# Patient Record
Sex: MALE | Race: WHITE | Employment: FULL TIME | URBAN - METROPOLITAN AREA
[De-identification: names, ages, dates, MRNs, and addresses within clinical notes are randomized per-mention and may not be internally consistent; named-entity substitution may affect disease eponyms.]

---

## 2017-10-27 ENCOUNTER — APPOINTMENT (OUTPATIENT)
Dept: CT IMAGING | Age: 47
End: 2017-10-27
Payer: COMMERCIAL

## 2017-10-27 ENCOUNTER — HOSPITAL ENCOUNTER (EMERGENCY)
Age: 47
Discharge: HOME | End: 2017-10-27
Payer: COMMERCIAL

## 2017-10-27 VITALS
BODY MASS INDEX: 22.91 KG/M2 | DIASTOLIC BLOOD PRESSURE: 76 MMHG | TEMPERATURE: 97.2 F | HEIGHT: 70 IN | HEART RATE: 70 BPM | OXYGEN SATURATION: 96 % | RESPIRATION RATE: 13 BRPM | WEIGHT: 160.06 LBS | SYSTOLIC BLOOD PRESSURE: 115 MMHG

## 2017-10-27 DIAGNOSIS — I10 HYPERTENSION, UNSPECIFIED TYPE: Primary | ICD-10-CM

## 2017-10-27 DIAGNOSIS — N20.0 KIDNEY STONE: ICD-10-CM

## 2017-10-27 DIAGNOSIS — N23 RENAL COLIC: ICD-10-CM

## 2017-10-27 LAB
ABSOLUTE EOS #: 0.2 K/UL (ref 0–0.4)
ABSOLUTE IMMATURE GRANULOCYTE: ABNORMAL K/UL (ref 0–0.3)
ABSOLUTE LYMPH #: 1.8 K/UL (ref 1–4.8)
ABSOLUTE MONO #: 0.9 K/UL (ref 0.2–0.8)
ANION GAP SERPL CALCULATED.3IONS-SCNC: 15 MMOL/L (ref 9–17)
BASOPHILS # BLD: 1 %
BASOPHILS ABSOLUTE: 0.1 K/UL (ref 0–0.2)
BUN BLDV-MCNC: 16 MG/DL (ref 6–20)
BUN/CREAT BLD: 17 (ref 9–20)
CALCIUM SERPL-MCNC: 8.7 MG/DL (ref 8.6–10.4)
CHLORIDE BLD-SCNC: 103 MMOL/L (ref 98–107)
CK MB: 1.7 NG/ML
CO2: 21 MMOL/L (ref 20–31)
CREAT SERPL-MCNC: 0.93 MG/DL (ref 0.7–1.2)
DIFFERENTIAL TYPE: ABNORMAL
EKG ATRIAL RATE: 83 BPM
EKG P AXIS: 67 DEGREES
EKG P-R INTERVAL: 144 MS
EKG Q-T INTERVAL: 362 MS
EKG QRS DURATION: 90 MS
EKG QTC CALCULATION (BAZETT): 425 MS
EKG R AXIS: 22 DEGREES
EKG T AXIS: -3 DEGREES
EKG VENTRICULAR RATE: 83 BPM
EOSINOPHILS RELATIVE PERCENT: 2 %
GFR AFRICAN AMERICAN: >60 ML/MIN
GFR NON-AFRICAN AMERICAN: >60 ML/MIN
GFR SERPL CREATININE-BSD FRML MDRD: ABNORMAL ML/MIN/{1.73_M2}
GFR SERPL CREATININE-BSD FRML MDRD: ABNORMAL ML/MIN/{1.73_M2}
GLUCOSE BLD-MCNC: 111 MG/DL (ref 70–99)
HCT VFR BLD CALC: 50.3 % (ref 41–53)
HEMOGLOBIN: 17 G/DL (ref 13.5–17.5)
IMMATURE GRANULOCYTES: ABNORMAL %
LYMPHOCYTES # BLD: 22 %
MCH RBC QN AUTO: 30 PG (ref 26–34)
MCHC RBC AUTO-ENTMCNC: 33.9 G/DL (ref 31–37)
MCV RBC AUTO: 88.5 FL (ref 80–100)
MONOCYTES # BLD: 11 %
MYOGLOBIN: 26 NG/ML (ref 28–72)
PDW BLD-RTO: 13.3 % (ref 11.5–14.5)
PLATELET # BLD: 218 K/UL (ref 130–400)
PLATELET ESTIMATE: ABNORMAL
PMV BLD AUTO: 8.5 FL (ref 6–12)
POTASSIUM SERPL-SCNC: 4.1 MMOL/L (ref 3.7–5.3)
RBC # BLD: 5.68 M/UL (ref 4.5–5.9)
RBC # BLD: ABNORMAL 10*6/UL
SEG NEUTROPHILS: 64 %
SEGMENTED NEUTROPHILS ABSOLUTE COUNT: 5.1 K/UL (ref 1.8–7.7)
SODIUM BLD-SCNC: 139 MMOL/L (ref 135–144)
TROPONIN INTERP: NORMAL
TROPONIN T: <0.03 NG/ML
WBC # BLD: 8.1 K/UL (ref 3.5–11)
WBC # BLD: ABNORMAL 10*3/UL

## 2017-10-27 PROCEDURE — 85025 COMPLETE CBC W/AUTO DIFF WBC: CPT

## 2017-10-27 PROCEDURE — 99285 EMERGENCY DEPT VISIT HI MDM: CPT

## 2017-10-27 PROCEDURE — 71275 CT ANGIOGRAPHY CHEST: CPT

## 2017-10-27 PROCEDURE — 2580000003 HC RX 258

## 2017-10-27 PROCEDURE — 93005 ELECTROCARDIOGRAM TRACING: CPT

## 2017-10-27 PROCEDURE — 72191 CT ANGIOGRAPH PELV W/O&W/DYE: CPT

## 2017-10-27 PROCEDURE — 82553 CREATINE MB FRACTION: CPT

## 2017-10-27 PROCEDURE — 6370000000 HC RX 637 (ALT 250 FOR IP)

## 2017-10-27 PROCEDURE — 96375 TX/PRO/DX INJ NEW DRUG ADDON: CPT

## 2017-10-27 PROCEDURE — 80048 BASIC METABOLIC PNL TOTAL CA: CPT

## 2017-10-27 PROCEDURE — 84484 ASSAY OF TROPONIN QUANT: CPT

## 2017-10-27 PROCEDURE — 6360000004 HC RX CONTRAST MEDICATION

## 2017-10-27 PROCEDURE — 96361 HYDRATE IV INFUSION ADD-ON: CPT

## 2017-10-27 PROCEDURE — 96374 THER/PROPH/DIAG INJ IV PUSH: CPT

## 2017-10-27 PROCEDURE — 83874 ASSAY OF MYOGLOBIN: CPT

## 2017-10-27 PROCEDURE — 6360000002 HC RX W HCPCS

## 2017-10-27 RX ORDER — CLONIDINE HYDROCHLORIDE 0.1 MG/1
0.2 TABLET ORAL ONCE
Status: COMPLETED | OUTPATIENT
Start: 2017-10-27 | End: 2017-10-27

## 2017-10-27 RX ORDER — SIMVASTATIN 20 MG
20 TABLET ORAL NIGHTLY
COMMUNITY

## 2017-10-27 RX ORDER — OXYCODONE HYDROCHLORIDE AND ACETAMINOPHEN 5; 325 MG/1; MG/1
1-2 TABLET ORAL EVERY 6 HOURS PRN
Qty: 20 TABLET | Refills: 0 | Status: SHIPPED | OUTPATIENT
Start: 2017-10-27

## 2017-10-27 RX ORDER — HYDRALAZINE HYDROCHLORIDE 20 MG/ML
10 INJECTION INTRAMUSCULAR; INTRAVENOUS EVERY 6 HOURS PRN
Status: DISCONTINUED | OUTPATIENT
Start: 2017-10-27 | End: 2017-10-27 | Stop reason: HOSPADM

## 2017-10-27 RX ORDER — 0.9 % SODIUM CHLORIDE 0.9 %
50 INTRAVENOUS SOLUTION INTRAVENOUS ONCE
Status: COMPLETED | OUTPATIENT
Start: 2017-10-27 | End: 2017-10-27

## 2017-10-27 RX ORDER — CEPHALEXIN 500 MG/1
500 CAPSULE ORAL 3 TIMES DAILY
Qty: 21 CAPSULE | Refills: 0 | Status: SHIPPED | OUTPATIENT
Start: 2017-10-27

## 2017-10-27 RX ORDER — SODIUM CHLORIDE 0.9 % (FLUSH) 0.9 %
10 SYRINGE (ML) INJECTION PRN
Status: DISCONTINUED | OUTPATIENT
Start: 2017-10-27 | End: 2017-10-27 | Stop reason: HOSPADM

## 2017-10-27 RX ORDER — TAMSULOSIN HYDROCHLORIDE 0.4 MG/1
0.4 CAPSULE ORAL DAILY
Qty: 5 CAPSULE | Refills: 0 | Status: SHIPPED | OUTPATIENT
Start: 2017-10-27 | End: 2017-11-01

## 2017-10-27 RX ORDER — PROMETHAZINE HYDROCHLORIDE 25 MG/1
25 TABLET ORAL EVERY 6 HOURS PRN
Qty: 20 TABLET | Refills: 0 | Status: SHIPPED | OUTPATIENT
Start: 2017-10-27 | End: 2017-11-03

## 2017-10-27 RX ORDER — PROMETHAZINE HYDROCHLORIDE 25 MG/ML
12.5 INJECTION, SOLUTION INTRAMUSCULAR; INTRAVENOUS ONCE
Status: COMPLETED | OUTPATIENT
Start: 2017-10-27 | End: 2017-10-27

## 2017-10-27 RX ORDER — ASPIRIN 81 MG/1
81 TABLET, CHEWABLE ORAL DAILY
COMMUNITY

## 2017-10-27 RX ORDER — 0.9 % SODIUM CHLORIDE 0.9 %
1000 INTRAVENOUS SOLUTION INTRAVENOUS ONCE
Status: COMPLETED | OUTPATIENT
Start: 2017-10-27 | End: 2017-10-27

## 2017-10-27 RX ADMIN — Medication 10 ML: at 02:55

## 2017-10-27 RX ADMIN — SODIUM CHLORIDE 50 ML: 9 INJECTION, SOLUTION INTRAVENOUS at 02:55

## 2017-10-27 RX ADMIN — HYDRALAZINE HYDROCHLORIDE 10 MG: 20 INJECTION INTRAMUSCULAR; INTRAVENOUS at 02:01

## 2017-10-27 RX ADMIN — IOPAMIDOL 100 ML: 755 INJECTION, SOLUTION INTRAVENOUS at 02:55

## 2017-10-27 RX ADMIN — SODIUM CHLORIDE 1000 ML: 9 INJECTION, SOLUTION INTRAVENOUS at 01:07

## 2017-10-27 RX ADMIN — PROMETHAZINE HYDROCHLORIDE 12.5 MG: 25 INJECTION INTRAMUSCULAR; INTRAVENOUS at 01:07

## 2017-10-27 RX ADMIN — HYDROMORPHONE HYDROCHLORIDE 1 MG: 1 INJECTION, SOLUTION INTRAMUSCULAR; INTRAVENOUS; SUBCUTANEOUS at 01:07

## 2017-10-27 RX ADMIN — CLONIDINE HYDROCHLORIDE 0.2 MG: 0.1 TABLET ORAL at 01:48

## 2017-10-27 ASSESSMENT — PAIN DESCRIPTION - PROGRESSION: CLINICAL_PROGRESSION: NOT CHANGED

## 2017-10-27 ASSESSMENT — PAIN DESCRIPTION - PAIN TYPE
TYPE: ACUTE PAIN
TYPE: ACUTE PAIN

## 2017-10-27 ASSESSMENT — ENCOUNTER SYMPTOMS
PHOTOPHOBIA: 0
SINUS PRESSURE: 0
VOMITING: 1
CHEST TIGHTNESS: 1
COUGH: 1
ABDOMINAL DISTENTION: 1
ABDOMINAL PAIN: 1
NAUSEA: 1
EYE REDNESS: 0
SHORTNESS OF BREATH: 0
CONSTIPATION: 1

## 2017-10-27 ASSESSMENT — PAIN SCALES - GENERAL
PAINLEVEL_OUTOF10: 2
PAINLEVEL_OUTOF10: 8

## 2017-10-27 ASSESSMENT — PAIN DESCRIPTION - DESCRIPTORS: DESCRIPTORS: SHARP

## 2017-10-27 ASSESSMENT — PAIN DESCRIPTION - FREQUENCY: FREQUENCY: CONTINUOUS

## 2017-10-27 ASSESSMENT — PAIN DESCRIPTION - LOCATION: LOCATION: ABDOMEN

## 2017-10-27 ASSESSMENT — PAIN DESCRIPTION - ORIENTATION: ORIENTATION: RIGHT

## 2017-10-27 NOTE — ED NOTES
Return from CT back on monitor.  Patient resting on cot and pain is \"better\" stated patient     Michela Verma RN  10/27/17 1967

## 2017-10-27 NOTE — ED PROVIDER NOTES
[address] [place] [address] ED  eMERGENCY dEPARTMENT eNCOUnter      Pt Name: Song Still  MRN: [id number]  [place] 1970  Date of evaluation: 10/27/2017  Provider: Peri Miller MD    CHIEF COMPLAINT       Chief Complaint   Patient presents with    Abdominal Pain     rt sided onset hour ago         HISTORY OF PRESENT ILLNESS  (Location/Symptom, Timing/Onset, Context/Setting, Quality, Duration, Modifying Factors, Severity.)   Song Still is a 50 y.o. male who presents to the emergency department Presents to the emergency department with pain at 10 on a scale of 1-10. States the pains in his flank area radiating into the groin. He also complains of chest pain with shortness of breath. Some nausea and vomiting. Patient does have a history of kidney stones in the past.  Also has a history of hypertensive disease. The pain while here seemed to get much worse as his blood pressure went up. Nursing Notes were reviewed. ALLERGIES     Ambien [zolpidem tartrate]; Morphine; and Vicodin [hydrocodone-acetaminophen]    CURRENT MEDICATIONS       Previous Medications    ACETAMINOPHEN-CODEINE (TYLENOL/CODEINE #3) 300-30 MG TABS    Take 1 tablet by mouth every 6 hours as needed    ALBUTEROL (PROVENTIL HFA) 108 (90 BASE) MCG/ACT AERS    Inhale 2 puffs into the lungs every 6 hours as needed (dyspnea, wheezing)    ALPRAZOLAM (XANAX) 0.5 MG TABLET    Take 0.5 mg by mouth nightly as needed for Sleep. ASPIRIN 81 MG CHEWABLE TABLET    Take 81 mg by mouth daily    AZITHROMYCIN (ZITHROMAX) 250 MG TABS    Take two tablets on day 1, followed by one tablet on days 2-5. CITALOPRAM (CELEXA) 10 MG TABLET    Take 10 mg by mouth daily. CYCLOBENZAPRINE (FLEXERIL) 10 MG TABLET    Take 1 tablet by mouth 3 times daily as needed for Muscle spasms. IBUPROFEN (ADVIL;MOTRIN) 800 MG TABLET    Take 1 tablet by mouth every 8 hours as needed for Pain. LISINOPRIL (PRINIVIL;ZESTRIL) 20 MG TABLET    Take 20 mg by mouth daily. METOPROLOL (TOPROL-XL) 25 MG XL TABLET    Take 25 mg by mouth daily. PROMETHAZINE-DEXTROMETHORPHAN (PROMETHAZINE-DM) 6.25-15 MG/5ML SYRUP    Take 5 mLs by mouth 4 times daily as needed for Cough    SIMVASTATIN (ZOCOR) 20 MG TABLET    Take 20 mg by mouth nightly       PAST MEDICAL HISTORY         Diagnosis Date    CAD (coronary artery disease)     Depression     Hypertension     Kidney stone        SURGICAL HISTORY           Procedure Laterality Date    APPENDECTOMY      BACK SURGERY      L5S1 fusion    CHOLECYSTECTOMY      CORONARY ANGIOPLASTY WITH STENT PLACEMENT  2011    CRANIOTOMY      x 2 for benign cyst    CSF SHUNT      HERNIA REPAIR      SHOULDER ARTHROPLASTY Right          FAMILY HISTORY     History reviewed. No pertinent family history. No family status information on file. SOCIAL HISTORY      reports that he has never smoked. He has never used smokeless tobacco. He reports that he drinks alcohol. He reports that he does not use drugs. REVIEW OF SYSTEMS    (2-9 systems for level 4, 10 or more for level 5)     Review of Systems   Constitutional: Positive for activity change, appetite change and fatigue. HENT: Positive for congestion. Negative for sinus pressure. Eyes: Negative for photophobia, redness and visual disturbance. Respiratory: Positive for cough and chest tightness. Negative for shortness of breath. Cardiovascular: Positive for chest pain and leg swelling. Negative for palpitations. Gastrointestinal: Positive for abdominal distention, abdominal pain, constipation, nausea and vomiting. Endocrine: Negative for polydipsia and polyphagia. Genitourinary: Positive for dysuria, flank pain, penile pain and urgency. Musculoskeletal: Positive for myalgias. Skin: Negative. Neurological: Negative for dizziness, facial asymmetry and weakness. Psychiatric/Behavioral: Negative for agitation and behavioral problems.         Except as noted above the remainder of the review of systems was reviewed and negative. PHYSICAL EXAM    (up to 7 for level 4, 8 or more for level 5)     ED Triage Vitals [10/27/17 0029]   BP Temp Temp Source Pulse Resp SpO2 Height Weight   (!) 192/133 97.2 °F (36.2 °C) Oral 86 18 96 % 5' 10\" (1.778 m) 160 lb 1 oz (72.6 kg)       Physical Exam   Constitutional: He is oriented to person, place, and time. He appears well-developed and well-nourished. HENT:   Head: Normocephalic and atraumatic. Right Ear: External ear normal.   Left Ear: External ear normal.   Mouth/Throat: No oropharyngeal exudate. Eyes: Conjunctivae are normal.   Neck: Normal range of motion. Neck supple. No tracheal deviation present. No thyromegaly present. Cardiovascular: Normal rate and regular rhythm. Exam reveals no gallop and no friction rub. No murmur heard. Pulmonary/Chest: Effort normal and breath sounds normal. No respiratory distress. Abdominal: Soft. Bowel sounds are normal. He exhibits no distension. There is no tenderness. Musculoskeletal: Normal range of motion. He exhibits no edema, tenderness or deformity. Neurological: He is alert and oriented to person, place, and time. Skin: Skin is warm and dry. Psychiatric: He has a normal mood and affect. Nursing note and vitals reviewed. DIAGNOSTIC RESULTS     EKG: All EKG's are interpreted by the Emergency Department Physician who either signs or Co-signs this chart in the absence of a cardiologist.    Normal sinus rhythm with occasional PVCs unifocal no evidence of acute process. Nonspecific ST wave abnormality.     RADIOLOGY:   Non-plain film images such as CT, Ultrasound and MRI are read by the radiologist. Plain radiographic images are visualized and preliminarily interpreted by the emergency physician with the below findings:    Because the patient developed substernal crushing chest pain associated with a elevated blood pressure a CAT scan of the chest abdomen and pelvis to rule out dissection was ordered  Cta Chest W Contrast    Result Date: 10/27/2017  1. No evidence of dissection of the thoracic or abdominal aorta. 2. Mild fusiform ectasia of the ascending thoracic aorta 4.0 cm. Otherwise no aneurysm of the thoracic or abdominal aorta. 3. Right axillary surgical clips in place with an abnormal appearing nonspecific 2.4 cm right axillary lymph node. Correlate with surgical history. 4. 4 mm obstructing calculus in the mid right ureter with upstream mild/moderate hydroureter and hydronephrosis. 5. 10 mm atlas within the left renal pelvis with what appears to be moderate uroepithelial thickening of the renal pelvis. 6. Additional 4 mm nonobstructing left renal calculus. 7. Prostatomegaly. 8. Colonic diverticulosis without evidence of diverticulitis. Cta Abdomen Pelvis W Contrast    Result Date: 10/27/2017  1. No evidence of dissection of the thoracic or abdominal aorta. 2. Mild fusiform ectasia of the ascending thoracic aorta 4.0 cm. Otherwise no aneurysm of the thoracic or abdominal aorta. 3. Right axillary surgical clips in place with an abnormal appearing nonspecific 2.4 cm right axillary lymph node. Correlate with surgical history. 4. 4 mm obstructing calculus in the mid right ureter with upstream mild/moderate hydroureter and hydronephrosis. 5. 10 mm atlas within the left renal pelvis with what appears to be moderate uroepithelial thickening of the renal pelvis. 6. Additional 4 mm nonobstructing left renal calculus. 7. Prostatomegaly. 8. Colonic diverticulosis without evidence of diverticulitis. Interpretation per the Radiologist below, if available at the time of this note:    CTA CHEST W CONTRAST   Preliminary Result   1. No evidence of dissection of the thoracic or abdominal aorta. 2. Mild fusiform ectasia of the ascending thoracic aorta 4.0 cm. Otherwise   no aneurysm of the thoracic or abdominal aorta.    3. Right axillary surgical clips in place with an abnormal appearing   nonspecific 2.4 cm right axillary lymph node. Correlate with surgical   history. 4. 4 mm obstructing calculus in the mid right ureter with upstream   mild/moderate hydroureter and hydronephrosis. 5. 10 mm atlas within the left renal pelvis with what appears to be moderate   uroepithelial thickening of the renal pelvis. 6. Additional 4 mm nonobstructing left renal calculus. 7. Prostatomegaly. 8. Colonic diverticulosis without evidence of diverticulitis. CTA ABDOMEN PELVIS W CONTRAST   Preliminary Result   1. No evidence of dissection of the thoracic or abdominal aorta. 2. Mild fusiform ectasia of the ascending thoracic aorta 4.0 cm. Otherwise   no aneurysm of the thoracic or abdominal aorta. 3. Right axillary surgical clips in place with an abnormal appearing   nonspecific 2.4 cm right axillary lymph node. Correlate with surgical   history. 4. 4 mm obstructing calculus in the mid right ureter with upstream   mild/moderate hydroureter and hydronephrosis. 5. 10 mm atlas within the left renal pelvis with what appears to be moderate   uroepithelial thickening of the renal pelvis. 6. Additional 4 mm nonobstructing left renal calculus. 7. Prostatomegaly. 8. Colonic diverticulosis without evidence of diverticulitis. ED BEDSIDE ULTRASOUND:   Performed by ED Physician - none    LABS:  Labs Reviewed   CBC WITH AUTO DIFFERENTIAL - Abnormal; Notable for the following:        Result Value    Absolute Mono # 0.90 (*)     All other components within normal limits   BASIC METABOLIC PANEL - Abnormal; Notable for the following:     Glucose 111 (*)     All other components within normal limits   URINE CULTURE   URINALYSIS       All other labs were within normal range or not returned as of this dictation.     EMERGENCY DEPARTMENT COURSE and DIFFERENTIAL DIAGNOSIS/MDM:   Vitals:    Vitals:    10/27/17 0159 10/27/17 0213 10/27/17 0314 10/27/17 0333   BP: (!) 156/112 (!) 153/95 115/84 122/83   Pulse: 74 82 85 76   Resp: 12 13 12 13   Temp:       TempSrc:       SpO2: 94% 95% 95% 95%   Weight:       Height:         Patient was medicated with hydralazine Dilaudid and Phenergan. We were going to start him on a Cardene drip but his pressure did respond nicely to the hydralazine and his blood pressure is now 122/83. His pain is gone except for some very mild colicky right flank pain. He is resting comfortably. His troponin myoglobin remained stable and his pain is controlled he will be discharged and will follow-up as a outpatient for his kidney stone. The hydronephrosis on the right is caused by 4 mm stone. Critical care time on this patient's 35 minutes    CONSULTS:  None    PROCEDURES:  Not clinically indicated. FINAL IMPRESSION      1. Hypertension, unspecified type    2. Renal colic    3. Kidney stone          DISPOSITION/PLAN   DISPOSITION Decision to Discharge    PATIENT REFERRED TO:   No follow-up provider specified. DISCHARGE MEDICATIONS:     New Prescriptions    CEPHALEXIN (KEFLEX) 500 MG CAPSULE    Take 1 capsule by mouth 3 times daily    OXYCODONE-ACETAMINOPHEN (PERCOCET) 5-325 MG PER TABLET    Take 1-2 tablets by mouth every 6 hours as needed for Pain . PROMETHAZINE (PHENERGAN) 25 MG TABLET    Take 1 tablet by mouth every 6 hours as needed for Nausea WARNING:  May cause drowsiness. May impair ability to operate vehicles or machinery. Do not use in combination with alcohol.     TAMSULOSIN (FLOMAX) 0.4 MG CAPSULE    Take 1 capsule by mouth daily for 5 doses           (Please note that portions of this note were completed with a voice recognition program.  Efforts were made to edit the dictations but occasionally words are mis-transcribed.)    Nick Mcpherson MD  Attending Emergency Physician           Nick Mcpherson MD  10/27/17 [id number]

## 2017-11-20 ENCOUNTER — APPOINTMENT (OUTPATIENT)
Dept: ULTRASOUND IMAGING | Age: 47
End: 2017-11-20
Payer: COMMERCIAL

## 2017-11-20 ENCOUNTER — HOSPITAL ENCOUNTER (EMERGENCY)
Age: 47
Discharge: HOME | End: 2017-11-20
Attending: EMERGENCY MEDICINE
Payer: COMMERCIAL

## 2017-11-20 VITALS
SYSTOLIC BLOOD PRESSURE: 154 MMHG | OXYGEN SATURATION: 98 % | DIASTOLIC BLOOD PRESSURE: 110 MMHG | WEIGHT: 161 LBS | BODY MASS INDEX: 23.05 KG/M2 | RESPIRATION RATE: 16 BRPM | HEIGHT: 70 IN | TEMPERATURE: 98 F | HEART RATE: 79 BPM

## 2017-11-20 DIAGNOSIS — R10.9 FLANK PAIN: Primary | ICD-10-CM

## 2017-11-20 LAB
ABSOLUTE EOS #: 0.1 K/UL (ref 0–0.4)
ABSOLUTE IMMATURE GRANULOCYTE: ABNORMAL K/UL (ref 0–0.3)
ABSOLUTE LYMPH #: 0.8 K/UL (ref 1–4.8)
ABSOLUTE MONO #: 0.8 K/UL (ref 0.2–0.8)
ANION GAP SERPL CALCULATED.3IONS-SCNC: 11 MMOL/L (ref 9–17)
BASOPHILS # BLD: 1 %
BASOPHILS ABSOLUTE: 0 K/UL (ref 0–0.2)
BUN BLDV-MCNC: 12 MG/DL (ref 6–20)
BUN/CREAT BLD: 15 (ref 9–20)
CALCIUM SERPL-MCNC: 9.5 MG/DL (ref 8.6–10.4)
CHLORIDE BLD-SCNC: 103 MMOL/L (ref 98–107)
CO2: 26 MMOL/L (ref 20–31)
CREAT SERPL-MCNC: 0.79 MG/DL (ref 0.7–1.2)
DIFFERENTIAL TYPE: ABNORMAL
EOSINOPHILS RELATIVE PERCENT: 2 %
GFR AFRICAN AMERICAN: >60 ML/MIN
GFR NON-AFRICAN AMERICAN: >60 ML/MIN
GFR SERPL CREATININE-BSD FRML MDRD: ABNORMAL ML/MIN/{1.73_M2}
GFR SERPL CREATININE-BSD FRML MDRD: ABNORMAL ML/MIN/{1.73_M2}
GLUCOSE BLD-MCNC: 106 MG/DL (ref 70–99)
HCT VFR BLD CALC: 47.9 % (ref 41–53)
HEMOGLOBIN: 16.2 G/DL (ref 13.5–17.5)
IMMATURE GRANULOCYTES: ABNORMAL %
LYMPHOCYTES # BLD: 13 %
MCH RBC QN AUTO: 29.9 PG (ref 26–34)
MCHC RBC AUTO-ENTMCNC: 33.8 G/DL (ref 31–37)
MCV RBC AUTO: 88.3 FL (ref 80–100)
MONOCYTES # BLD: 13 %
PDW BLD-RTO: 13 % (ref 11.5–14.5)
PLATELET # BLD: 312 K/UL (ref 130–400)
PLATELET ESTIMATE: ABNORMAL
PMV BLD AUTO: 7.3 FL (ref 6–12)
POTASSIUM SERPL-SCNC: 4.4 MMOL/L (ref 3.7–5.3)
RBC # BLD: 5.43 M/UL (ref 4.5–5.9)
RBC # BLD: ABNORMAL 10*6/UL
SEG NEUTROPHILS: 71 %
SEGMENTED NEUTROPHILS ABSOLUTE COUNT: 4.2 K/UL (ref 1.8–7.7)
SODIUM BLD-SCNC: 140 MMOL/L (ref 135–144)
WBC # BLD: 5.9 K/UL (ref 3.5–11)
WBC # BLD: ABNORMAL 10*3/UL

## 2017-11-20 PROCEDURE — 99284 EMERGENCY DEPT VISIT MOD MDM: CPT

## 2017-11-20 PROCEDURE — 96374 THER/PROPH/DIAG INJ IV PUSH: CPT

## 2017-11-20 PROCEDURE — 6360000002 HC RX W HCPCS: Performed by: NURSE PRACTITIONER

## 2017-11-20 PROCEDURE — 76775 US EXAM ABDO BACK WALL LIM: CPT

## 2017-11-20 PROCEDURE — 85025 COMPLETE CBC W/AUTO DIFF WBC: CPT

## 2017-11-20 PROCEDURE — 96375 TX/PRO/DX INJ NEW DRUG ADDON: CPT

## 2017-11-20 PROCEDURE — 96376 TX/PRO/DX INJ SAME DRUG ADON: CPT

## 2017-11-20 PROCEDURE — 80048 BASIC METABOLIC PNL TOTAL CA: CPT

## 2017-11-20 RX ORDER — ONDANSETRON 2 MG/ML
4 INJECTION INTRAMUSCULAR; INTRAVENOUS ONCE
Status: COMPLETED | OUTPATIENT
Start: 2017-11-20 | End: 2017-11-20

## 2017-11-20 RX ORDER — HYDROMORPHONE HCL 110MG/55ML
1 PATIENT CONTROLLED ANALGESIA SYRINGE INTRAVENOUS ONCE
Status: COMPLETED | OUTPATIENT
Start: 2017-11-20 | End: 2017-11-20

## 2017-11-20 RX ADMIN — HYDROMORPHONE HYDROCHLORIDE 1 MG: 2 INJECTION, SOLUTION INTRAMUSCULAR; INTRAVENOUS; SUBCUTANEOUS at 12:31

## 2017-11-20 RX ADMIN — ONDANSETRON 4 MG: 2 INJECTION INTRAMUSCULAR; INTRAVENOUS at 10:52

## 2017-11-20 RX ADMIN — HYDROMORPHONE HYDROCHLORIDE 1 MG: 1 INJECTION, SOLUTION INTRAMUSCULAR; INTRAVENOUS; SUBCUTANEOUS at 10:53

## 2017-11-20 ASSESSMENT — PAIN SCALES - GENERAL
PAINLEVEL_OUTOF10: 6
PAINLEVEL_OUTOF10: 3
PAINLEVEL_OUTOF10: 6
PAINLEVEL_OUTOF10: 5

## 2017-11-20 ASSESSMENT — ENCOUNTER SYMPTOMS
DIARRHEA: 0
NAUSEA: 0
ABDOMINAL PAIN: 0
COLOR CHANGE: 0
SORE THROAT: 0
SINUS PRESSURE: 0
CONSTIPATION: 0
COUGH: 0
WHEEZING: 0
RHINORRHEA: 0
VOMITING: 0
SHORTNESS OF BREATH: 0

## 2017-11-20 ASSESSMENT — PAIN DESCRIPTION - LOCATION: LOCATION: FLANK

## 2017-11-20 ASSESSMENT — PAIN DESCRIPTION - FREQUENCY: FREQUENCY: CONTINUOUS

## 2017-11-20 ASSESSMENT — PAIN DESCRIPTION - ORIENTATION: ORIENTATION: RIGHT

## 2017-11-20 ASSESSMENT — PAIN SCALES - WONG BAKER: WONGBAKER_NUMERICALRESPONSE: 6

## 2017-11-20 NOTE — ED PROVIDER NOTES
The patient was seen and examined by me in conjunction with the mid-level provider. I agree with his/her assessment and treatment plan. The patient is feeling improved and renal ultrasound does not show hydronephrosis.   He will follow-up promptly with his urologist.     Swapna lCark MD  11/20/17 [id number]

## 2017-11-20 NOTE — ED NOTES
Calls out c/o increase in pain. Up to 7/10. Mery Polanco CNP informed.      Kasey Goetz RN  11/20/17 7030

## 2017-11-20 NOTE — ED PROVIDER NOTES
[address] [place] [address] ED  eMERGENCY dEPARTMENT eNCOUnter      Pt Name: Michelle Hood  MRN: [id number]  [place] 1970  Date of evaluation: 11/20/2017  Provider: Jailyn Edwards NP, [facility] 2095       Chief Complaint   Patient presents with    Flank Pain     right onset 8am history of kidney stones         HISTORY OF PRESENT ILLNESS  (Location/Symptom, Timing/Onset, Context/Setting, Quality, Duration, Modifying Factors, Severity.)   Michelle Hood is a 50 y.o. male who presents to the emergency department Today by private vehicle for evaluation of flank pain. Patient states that about 8:00 this morning he developed a sudden onset of pain to his right flank. He states he can slightly feel the pain in the right abdomen. He rates the pain a 7 on a 0-10 scale. He states he took 2 oxycodone which did improve his pain. He does have a history of kidney stones with his last CAT scan  the 27th which showed a 4 mm mid right ureteral stone. He does not recall passing stone. He denies any nausea or vomiting. She fevers or chills. Nursing Notes were reviewed. ALLERGIES     Ambien [zolpidem tartrate]; Morphine; and Vicodin [hydrocodone-acetaminophen]    CURRENT MEDICATIONS       Discharge Medication List as of 11/20/2017  1:35 PM      CONTINUE these medications which have NOT CHANGED    Details   aspirin 81 MG chewable tablet Take 81 mg by mouth dailyHistorical Med      simvastatin (ZOCOR) 20 MG tablet Take 20 mg by mouth nightlyHistorical Med      oxyCODONE-acetaminophen (PERCOCET) 5-325 MG per tablet Take 1-2 tablets by mouth every 6 hours as needed for Pain ., Disp-20 tablet, R-0Print      citalopram (CELEXA) 10 MG tablet Take 10 mg by mouth daily. lisinopril (PRINIVIL;ZESTRIL) 20 MG tablet Take 20 mg by mouth daily. metoprolol (TOPROL-XL) 25 MG XL tablet Take 25 mg by mouth daily.       ALPRAZolam (XANAX) 0.5 MG tablet Take 0.5 mg by mouth nightly as needed for Sleep.      tamsulosin (FLOMAX) 0.4 MG capsule Take 1 capsule by mouth daily for 5 doses, Disp-5 capsule, R-0Print      cephALEXin (KEFLEX) 500 MG capsule Take 1 capsule by mouth 3 times daily, Disp-21 capsule, R-0Print      albuterol (PROVENTIL HFA) 108 (90 BASE) MCG/ACT AERS Inhale 2 puffs into the lungs every 6 hours as needed (dyspnea, wheezing), Disp-1 Inhaler, R-0      promethazine-dextromethorphan (PROMETHAZINE-DM) 6.25-15 MG/5ML syrup Take 5 mLs by mouth 4 times daily as needed for Cough, Disp-140 mL, R-0      acetaminophen-codeine (TYLENOL/CODEINE #3) 300-30 MG TABS Take 1 tablet by mouth every 6 hours as needed, Disp-20 tablet, R-0      azithromycin (ZITHROMAX) 250 MG TABS Take two tablets on day 1, followed by one tablet on days 2-5., Disp-1 packet, R-0      cyclobenzaprine (FLEXERIL) 10 MG tablet Take 1 tablet by mouth 3 times daily as needed for Muscle spasms. , Disp-20 tablet, R-0      ibuprofen (ADVIL;MOTRIN) 800 MG tablet Take 1 tablet by mouth every 8 hours as needed for Pain., Disp-20 tablet, R-0             PAST MEDICAL HISTORY         Diagnosis Date    CAD (coronary artery disease)     Depression     Hyperlipidemia     Hypertension     Kidney stone        SURGICAL HISTORY           Procedure Laterality Date    APPENDECTOMY      BACK SURGERY      L5S1 fusion    CHOLECYSTECTOMY      CORONARY ANGIOPLASTY WITH STENT PLACEMENT  2011    CRANIOTOMY      x 2 for benign cyst    CSF SHUNT      HERNIA REPAIR      SHOULDER ARTHROPLASTY Right          FAMILY HISTORY     History reviewed. No pertinent family history. No family status information on file. SOCIAL HISTORY      reports that he has never smoked. He has never used smokeless tobacco. He reports that he drinks alcohol. He reports that he does not use drugs. REVIEW OF SYSTEMS    (2-9 systems for level 4, 10 or more for level 5)     Review of Systems   Constitutional: Negative for chills, fever and unexpected weight change.    HENT: Negative for congestion, rhinorrhea, sinus pressure and sore throat. Respiratory: Negative for cough, shortness of breath and wheezing. Cardiovascular: Negative for chest pain and palpitations. Gastrointestinal: Negative for abdominal pain, constipation, diarrhea, nausea and vomiting. Genitourinary: Positive for flank pain. Negative for dysuria and hematuria. Musculoskeletal: Negative for arthralgias and myalgias. Skin: Negative for color change and rash. Neurological: Negative for dizziness, weakness and headaches. Hematological: Negative for adenopathy. Except as noted above the remainder of the review of systems was reviewed and negative. PHYSICAL EXAM    (up to 7 for level 4, 8 or more for level 5)     ED Triage Vitals [11/20/17 1031]   BP Temp Temp Source Pulse Resp SpO2 Height Weight   (!) 154/110 98 °F (36.7 °C) Oral 79 16 98 % 5' 10\" (1.778 m) 161 lb (73 kg)       Physical Exam   Constitutional: He is oriented to person, place, and time. He appears well-developed and well-nourished. HENT:   Head: Normocephalic and atraumatic. Mouth/Throat: Oropharynx is clear and moist.   Eyes: Conjunctivae are normal. Pupils are equal, round, and reactive to light. Neck: Normal range of motion. Neck supple. Cardiovascular: Normal rate and regular rhythm. Pulmonary/Chest: Effort normal and breath sounds normal. No stridor. No respiratory distress. Abdominal: Soft. Bowel sounds are normal.   Musculoskeletal: Normal range of motion. Lymphadenopathy:     He has no cervical adenopathy. Neurological: He is alert and oriented to person, place, and time. Skin: Skin is warm and dry. No rash noted. Psychiatric: He has a normal mood and affect. Vitals reviewed.         RADIOLOGY:   Non-plain film images such as CT, Ultrasound and MRI are read by the radiologist. Plain radiographic images are visualized and preliminarily interpreted by the emergency physician with the below findings:    \Cta Abdomen Pelvis W Contrast    Result Date: 10/28/2017  EXAMINATION: CTA OF THE ABDOMEN AND PELVIS WITH AND WITHOUT CONTRAST; CTA OF THE CHEST 10/27/2017 3:14 am TECHNIQUE: CTA of the abdomen and pelvis was performed without and with the administration of intravenous contrast. Multiplanar reformatted images are provided for review. MIP images are provided for review. Dose modulation, iterative reconstruction, and/or weight based adjustment of the mA/kV was utilized to reduce the radiation dose to as low as reasonably achievable.; CTA of the chest was performed after the administration of intravenous contrast.  Multiplanar reformatted images are provided for review. MIP images are provided for review. Dose modulation, iterative reconstruction, and/or weight based adjustment of the mA/kV was utilized to reduce the radiation dose to as low as reasonably achievable. COMPARISON: None October 22, 2014 HISTORY: ORDERING SYSTEM PROVIDED HISTORY: Rule out dissection TECHNOLOGIST PROVIDED HISTORY: IV Only Contrast; ORDERING SYSTEM PROVIDED HISTORY: Rule out dissection FINDINGS: Chest: CTA:  There is fusiform ectasia of ascending thoracic aorta to 4.0 cm. The aortic arch great vessels are unremarkable. There is mild atherosclerotic disease of the descending thoracic aorta. There is otherwise no evidence of aneurysm or dissection of the thoracic aorta. Mediastinum: Visualized portion of the thyroid gland unremarkable. There is no mediastinal hemorrhage. There is no pneumomediastinum. There is no acute cardiac abnormality. There is no significant pericardial effusion or thickening. There are mild coronary artery calcifications. The central airways are widely patent. There is no significant lymphadenopathy. The main pulmonary artery caliber is not significantly dilated. The central pulmonary arteries demonstrate no filling defects to suggest pulmonary emboli. Lungs/pleura: Mild biapical scarring. No pneumothorax.   No pleural effusions. Mild dependent atelectatic changes are noted. Otherwise no consolidation. Soft Tissues/Bones: There are surgical clips in the right axilla. There is a 2.4 cm right axillary lymph node. There are mild degenerative changes of bilateral shoulders. No acute osseous abnormality of thoracic spine. Abdomen/Pelvis: CTA:  There is no acute abnormality of the abdominal aorta. There is minimal atherosclerotic disease at the distal aspect of the abdominal aorta, without evidence of aneurysmal dilatation or dissection. The celiac axis, SMA and GLORY vessels are widely patent. No acute abnormality of the major pelvic arteries. Organs: There is no acute hepatic abnormality. No intrahepatic bile duct dilatation. Status post cholecystectomy. Bilateral adrenal glands unremarkable. The spleen demonstrates no acute abnormality. The pancreas demonstrates no acute abnormality. There is a 10 mm stone within the left renal pelvis with what appears to be mild nonspecific uroepithelial thickening of the renal pelvis. Additional 4  mm nonobstructing left renal calculus is noted. There is a 4 mm obstructing calculus in the mid right ureter with mild/moderate upstream hydroureter and hydronephrosis. Bilateral kidneys otherwise enhance uniformly. No suspicious renal lesions identified. There is a 5 mm hypodensity in the right kidney which is too small to characterize, but likely represents a cyst. GI/Bowel: There is a small sliding-type hiatal hernia. Otherwise no acute gastric abnormality. No acute small bowel abnormality. No evidence of small bowel obstruction. There is scattered colonic diverticula without any evidence of diverticulitis. Otherwise no acute colonic abnormality. Appendix is not clearly seen. Pelvis: There is prostatomegaly.   Tiny amount of hyperdensity within the posterior bladder could represent layering stones, bladder wall calcification or contrast. Peritoneum/Retroperitoneum: There is no significant lymphadenopathy. No free intraperitoneal air. The IVC is grossly unremarkable. There is no free fluid. Bones/Soft Tissues: No acute osseous abnormality. Postsurgical changes from posterior L5-S1 fusion with transpedicular rods and screws in place, unchanged in appearance compared to the prior study.  shunt catheter terminates in the left upper abdomen. 1. No evidence of dissection of the thoracic or abdominal aorta. 2. Mild fusiform ectasia of the ascending thoracic aorta to 4.0 cm. Otherwise no aneurysm of the thoracic or abdominal aorta. 3. Right axillary surgical clips in place with an abnormal appearing nonspecific 2.4 cm right axillary lymph node. Correlate with surgical history. 4. There is a 4 mm obstructing calculus in the mid right ureter with upstream mild/moderate hydroureter and hydronephrosis. 5. There is a 10 mm calculus within the left renal pelvis with what appears to be moderate uroepithelial thickening of the renal pelvis. 6. Additional 4 mm nonobstructing left renal calculus. 7. Prostatomegaly. 8. Colonic diverticulosis without evidence of diverticulitis. Us Retroperitoneal Limited    Result Date: 11/20/2017  EXAMINATION: RETROPERITONEAL ULTRASOUND OF THE KIDNEYS AND URINARY BLADDER 11/20/2017 COMPARISON: None HISTORY: ORDERING SYSTEM PROVIDED HISTORY: history of stone, hydronephrosis? TECHNOLOGIST PROVIDED HISTORY: Ordering Physician Provided Reason for Exam: right abd pain x 1 month, h/o kidney stones FINDINGS: Kidneys: The right kidney measures 11.1 x 6.36 x 4.81 cm. The left kidney measures 11.92 x 4.76 x 4.56 cm. Kidneys demonstrate normal cortical echogenicity. No evidence of hydronephrosis or intrarenal stones. No cortical thinning is noted. Unremarkable ultrasound of the kidneys. Interpretation per the Radiologist below, if available at the time of this note:    2011 [facility]   Final Result   Unremarkable ultrasound of the kidneys. LABS:  Labs Reviewed   CBC WITH AUTO DIFFERENTIAL - Abnormal; Notable for the following:        Result Value    Absolute Lymph # 0.80 (*)     All other components within normal limits   BASIC METABOLIC PANEL - Abnormal; Notable for the following:     Glucose 106 (*)     All other components within normal limits   UA W/REFLEX CULTURE       All other labs were within normal range or not returned as of this dictation. EMERGENCY DEPARTMENT COURSE and DIFFERENTIAL DIAGNOSIS/MDM:   Vitals:    Vitals:    11/20/17 1031   BP: (!) 154/110   Pulse: 79   Resp: 16   Temp: 98 °F (36.7 °C)   TempSrc: Oral   SpO2: 98%   Weight: 161 lb (73 kg)   Height: 5' 10\" (1.778 m)       Medical Decision Making: Patient just had a CT scan on 27 October. We did do a renal ultrasound which showed no hydronephrosis or hydroureter. His urinalysis and labs are unremarkable. He is stable to be discharged home. Continue oxycodone for pain and follow-up with urology  Medications   HYDROmorphone (DILAUDID) injection 1 mg (1 mg Intravenous Given 11/20/17 1053)   ondansetron (ZOFRAN) injection 4 mg (4 mg Intravenous Given 11/20/17 1052)   HYDROmorphone (DILAUDID) injection 1 mg (1 mg Intravenous Given 11/20/17 1231)       FINAL IMPRESSION      1.  Flank pain          DISPOSITION/PLAN   DISPOSITION Decision to Discharge    PATIENT REFERRED TO:   Cece Leahy MD  [address] [place] [address]  [phone]    Call in 2 days      [address]  [phone]    Call in 2 days        DISCHARGE MEDICATIONS:     Discharge Medication List as of 11/20/2017  1:35 PM              (Please note that portions of this note were completed with a voice recognition program.  Efforts were made to edit the dictations but occasionally words are mis-transcribed.)    Kenya Roque NP, CNP  Certified Nurse Practitioner            Mariel Croft  11/20/17 [id number]

## 2018-12-31 ENCOUNTER — HOSPITAL ENCOUNTER (OUTPATIENT)
Age: 48
Setting detail: SPECIMEN
Discharge: HOME | End: 2018-12-31
Payer: COMMERCIAL

## 2019-01-04 LAB — SURGICAL PATHOLOGY REPORT: NORMAL

## 2019-12-11 ENCOUNTER — APPOINTMENT (OUTPATIENT)
Dept: GENERAL RADIOLOGY | Age: 49
End: 2019-12-11
Payer: COMMERCIAL

## 2019-12-11 ENCOUNTER — HOSPITAL ENCOUNTER (EMERGENCY)
Age: 49
Discharge: HOME | End: 2019-12-11
Attending: EMERGENCY MEDICINE
Payer: COMMERCIAL

## 2019-12-11 ENCOUNTER — APPOINTMENT (OUTPATIENT)
Dept: CT IMAGING | Age: 49
End: 2019-12-11
Payer: COMMERCIAL

## 2019-12-11 VITALS
OXYGEN SATURATION: 96 % | DIASTOLIC BLOOD PRESSURE: 116 MMHG | HEART RATE: 97 BPM | RESPIRATION RATE: 18 BRPM | TEMPERATURE: 98.4 F | WEIGHT: 160 LBS | SYSTOLIC BLOOD PRESSURE: 143 MMHG | BODY MASS INDEX: 22.9 KG/M2 | HEIGHT: 70 IN

## 2019-12-11 DIAGNOSIS — G89.29 ACUTE EXACERBATION OF CHRONIC LOW BACK PAIN: Primary | ICD-10-CM

## 2019-12-11 DIAGNOSIS — M25.512 ACUTE PAIN OF LEFT SHOULDER: ICD-10-CM

## 2019-12-11 DIAGNOSIS — M25.572 ACUTE LEFT ANKLE PAIN: ICD-10-CM

## 2019-12-11 DIAGNOSIS — M54.2 NECK PAIN: ICD-10-CM

## 2019-12-11 DIAGNOSIS — M54.50 ACUTE EXACERBATION OF CHRONIC LOW BACK PAIN: Primary | ICD-10-CM

## 2019-12-11 PROCEDURE — 72100 X-RAY EXAM L-S SPINE 2/3 VWS: CPT

## 2019-12-11 PROCEDURE — 6370000000 HC RX 637 (ALT 250 FOR IP): Performed by: STUDENT IN AN ORGANIZED HEALTH CARE EDUCATION/TRAINING PROGRAM

## 2019-12-11 PROCEDURE — 72125 CT NECK SPINE W/O DYE: CPT

## 2019-12-11 PROCEDURE — 73030 X-RAY EXAM OF SHOULDER: CPT

## 2019-12-11 PROCEDURE — 70450 CT HEAD/BRAIN W/O DYE: CPT

## 2019-12-11 PROCEDURE — 99284 EMERGENCY DEPT VISIT MOD MDM: CPT

## 2019-12-11 PROCEDURE — 73610 X-RAY EXAM OF ANKLE: CPT

## 2019-12-11 RX ORDER — CYCLOBENZAPRINE HCL 5 MG
5 TABLET ORAL 2 TIMES DAILY PRN
Qty: 10 TABLET | Refills: 0 | Status: SHIPPED | OUTPATIENT
Start: 2019-12-11 | End: 2019-12-21

## 2019-12-11 RX ORDER — NAPROXEN 250 MG/1
250 TABLET ORAL ONCE
Status: COMPLETED | OUTPATIENT
Start: 2019-12-11 | End: 2019-12-11

## 2019-12-11 RX ADMIN — NAPROXEN 250 MG: 250 TABLET ORAL at 05:35

## 2019-12-11 ASSESSMENT — ENCOUNTER SYMPTOMS
VOMITING: 0
SORE THROAT: 0
COUGH: 0
PHOTOPHOBIA: 0
BACK PAIN: 1
SHORTNESS OF BREATH: 0
NAUSEA: 0
ABDOMINAL PAIN: 0

## 2019-12-11 ASSESSMENT — PAIN DESCRIPTION - ORIENTATION: ORIENTATION: LOWER

## 2019-12-11 ASSESSMENT — PAIN DESCRIPTION - PAIN TYPE: TYPE: ACUTE PAIN

## 2019-12-11 ASSESSMENT — PAIN SCALES - GENERAL: PAINLEVEL_OUTOF10: 7

## 2019-12-11 ASSESSMENT — PAIN DESCRIPTION - DESCRIPTORS: DESCRIPTORS: SHARP

## 2019-12-11 ASSESSMENT — PAIN DESCRIPTION - LOCATION: LOCATION: BACK
